# Patient Record
(demographics unavailable — no encounter records)

---

## 2025-06-23 NOTE — HISTORY OF PRESENT ILLNESS
[de-identified] : The upper endoscopy performed at the [facility] [place] GI endoscopy suite on September 7, 2023 revealed mild diffuse gastritis. There was no evidence of esophageal or gastric varices noted. The gastric pathology performed on September 7, 2023 revealed esophageal mucosa with squamous mucosa with mild reflux changes with a tiny detached fragment of columnar mucosa that was negative for goblet cell/intestinal metaplasia or dysplasia with a PASF stain that is negative for fungal microorganisms (esophagus), gastric antral mucosa with mild chronic and focally active antral gastritis with reactive changes that was negative for Helicobacter pylori (antrum), gastric body mucosa with gastric oxyntic mucosa with changes of reactive gastropathy that was negative for Helicobacter pylori (body of the stomach) and duodenal mucosa with preserved villous architecture with no significant histopathologic changes that was negative for increased intraepithelial lymphocytes (duodenum).   The upper endoscopy performed at [facility] on August 26, 2021 revealed mild diffuse bile gastritis and scattered nonbleeding gastric ulcers in the body and antrum of the stomach. The gastric pathology performed on August 26, 2021 revealed fragments of unremarkable squamous esophageal epithelium with a PASF stain that was negative for fungal microorganisms (esophagus), gastric antral and body mucosa with chronic inactive gastritis that was negative for Helicobacter pylori (gastric antrum and body of the stomach) and duodenal mucosa with preserved villous architecture with no parasites or increased intraepithelial lymphocytes (duodenum).  [FreeTextEntry1] : The MR elastography and MRI of the abdomen with and without IV contrast performed on June 9, 2022 revealed no suspicious hepatic lesion, a normal hepatic fat fraction, no hepatic iron deposition and hepatic stiffness of 3.5-4 kPa of stage II-III fibrosis.  Also noted was a 1.2 cm left hepatic lobe simple cyst with no suspicious hepatic lesions.  \par   [de-identified] : The liver fibroscan performed on December 27, 2021 revealed median liver stiffness of 14.4 kPa with IQR/Median % of 3 % that correlates to a fibrosis stage of F4, CAP score of 227 steatosis grade of S0. \par  The abdominal ultrasound performed on March 19, 2021 revealed heterogeneity and subtle nodular contour of the liver again suggestive of cirrhosis but otherwise unremarkable abdominal sonogram.\par

## 2025-06-23 NOTE — HISTORY OF PRESENT ILLNESS
[de-identified] : The [place] the [facility] [place] [place] on September 7, 2023 revealed mild diffuse gastritis. There was no evidence of esophageal or gastric varices noted. The gastric pathology performed on September 7, 2023 revealed esophageal mucosa with squamous mucosa with mild reflux changes with a tiny detached fragment of columnar mucosa that was negative for goblet cell/intestinal metaplasia or dysplasia with a PASF stain that is negative for fungal microorganisms (esophagus), gastric antral mucosa with mild chronic and focally active antral gastritis with reactive changes that was negative for Helicobacter pylori (antrum), gastric body mucosa with gastric oxyntic mucosa with changes of reactive gastropathy that was negative for Helicobacter pylori (body of the stomach) and duodenal mucosa with preserved villous architecture with no significant histopathologic changes that was negative for increased intraepithelial lymphocytes (duodenum).   The upper endoscopy performed at [facility] on August 26, 2021 revealed mild diffuse bile gastritis and scattered nonbleeding gastric ulcers in the body and antrum of the stomach. The gastric pathology performed on August 26, 2021 revealed fragments of unremarkable squamous esophageal epithelium with a PASF stain that was negative for fungal microorganisms (esophagus), gastric antral and body mucosa with chronic inactive gastritis that was negative for Helicobacter pylori (gastric antrum and body of the stomach) and duodenal mucosa with preserved villous architecture with no parasites or increased intraepithelial lymphocytes (duodenum).  [FreeTextEntry1] : The MR elastography and MRI of the abdomen with and without IV contrast performed on June 9, 2022 revealed no suspicious hepatic lesion, a normal hepatic fat fraction, no hepatic iron deposition and hepatic stiffness of 3.5-4 kPa of stage II-III fibrosis.  Also noted was a 1.2 cm left hepatic lobe simple cyst with no suspicious hepatic lesions.  \par   [de-identified] : The liver fibroscan performed on December 27, 2021 revealed median liver stiffness of 14.4 kPa with IQR/Median % of 3 % that correlates to a fibrosis stage of F4, CAP score of 227 steatosis grade of S0. \par  The abdominal ultrasound performed on March 19, 2021 revealed heterogeneity and subtle nodular contour of the liver again suggestive of cirrhosis but otherwise unremarkable abdominal sonogram.\par

## 2025-06-23 NOTE — ASSESSMENT
[FreeTextEntry1] : Dyspepsia: The patient complains of dyspeptic symptoms.  The patient was advised to continue to abide by an anti-gas (low FOD-MAP) diet.  The patient was previously given a pamphlet for anti-gas (low FOD-MAP).  The patient and I reviewed the anti-gas (low FOD-MAP)diet at length again. The patient is to continue on a trial of Simethicone one tablet 4 times a day p.r.n. abdominal pain and gas. Reflux Esophagitis:  The patient had reflux esophagitis noted on prior upper endoscopy.  The patient was advised to avoid late-night meals and dietary indiscretions.  The patient was advised to avoid fried and fatty foods.  The patient was advised to abide by an anti-GERD diet. The patient was given a pamphlet for anti-GERD.  The patient and I reviewed the anti-GERD diet at length.  I recommend a trial of pantoprazole 40 mg once a day x 3 months for the reflux esophagitis. Gastritis: The patient has a history of gastritis noted on prior upper endoscopy. The patient is to avoid nonsteroidal anti-inflammatory drugs and aspirin. I recommend a trial of pantoprazole 40 mg once a day for 3 months for the symptoms.  Internal Hemorrhoids: The patient is to consider starting a trial of Anusol H. C. suppositories one per rectum nightly and Anusol HC2 .5% cream apply to affected area twice a day p.r.n. hemorrhoidal bleeding or pain. I also recommend a trial of Calmoseptine cream apply to affected area twice a day for hemorrhoidal discomfort.  I recommend Tucks pads for the hemorrhoids.  I recommend Sitz baths twice a day for the hemorrhoids.  I recommend avoid wearing tight underwear and use boxers.  I recommend avoid touching the perineal area.  The patient agreed to followup.  Anemia: The patient was found to have anemia on prior blood work.  The patient denies any bright red blood per rectum, melena or hematemesis.   I recommend a colonoscopy to assess the anemia.  The patient was told of the risks and benefits of the procedure.  The patient was told of the risks of perforation, emergency surgery, bleeding, infections and missed lesions.  The patient agreed and will schedule for the procedure. The patient is to be n.p.o. after midnight and bowel prep was given.  The patient is to return for the procedure.   History of Peptic Ulcer Disease: The patient has a prior history of peptic ulcer disease. The patient is to avoid nonsteroidal anti-inflammatory drugs and aspirin. If the symptoms recur, the patient may require an upper endoscopy to assess for peptic ulcer disease versus esophagitis. The patient was told of the risks and benefits of the procedure. The patient was told of the risks of perforation, emergency surgery, bleeding, infections and missed lesions. The patient agreed and will follow-up to reassess the symptoms. History of Chronic Hepatitis C, s/p Treatment: The patient completed treatment for chronic hepatitis C with unknown medication with Dr. Michel retired. The patient was found to have hepatitis C antibody positive. The Hepatitis C PCR RNA qualitative was not detected. The patient was advised to followup in the office in 6 months for repeat blood work and imaging studies to reassess the liver. The patient was advised to avoid alcohol and hepatotoxic agents. The patient agrees and will follow-up. Prior Endoscopic Procedures: The patient had a prior upper endoscopy and colonoscopy performed by another gastroenterologist, Dr. Michel and Dr. Madonna Diaz. I will try to obtain the prior upper endoscopy and colonoscopy reports. The patient is to sign another record release to obtain those records. I reviewed the recent abdominal ultrasound results. Liver Cirrhosis: The abdominal ultrasound performed on March 19, 2021 revealed heterogeneity and subtle nodular contour of the liver again suggestive of cirrhosis but otherwise unremarkable abdominal sonogram. I recommend follow up with Dr. Reji Chino for liver cirrhosis. The liver fibroscan performed on December 27, 2021 revealed cirrhosis F4. The prior upper endoscopy did not reveal esophageal varices. The patient agrees and will followup. Colon Cancer screening: I recommend colonoscopy for colon cancer screening over the age of 45 to assess for colonic polyps. The patient was told of the risks and benefits of the procedure.  The patient was told of the risks of perforation, emergency surgery, bleeding, infections and missed lesions. The patient is to be on a clear liquid diet the entire day prior to the procedure. The patient is to complete the entire prescribed bowel prep the day prior to the procedure as directed. The patient is told not to drive, drink alcohol, use recreational drugs, exercise, or work the day of the procedure.  The patient was told of the need for an escort to accompany the patient home after the procedure. The patient is aware that the procedure may be cancelled if they fail to follow the directions.  The patient agreed and will schedule for the procedure. The patient can take the antihypertensive medication with a sip of water one hour prior to the procedure. The patient is to be n.p.o. after midnight and bowel prep was given.  The patient is to return for the procedure. Follow-up: The patient is to follow-up in the office in 4 weeks to reassess the symptoms. The patient was told to call the office if any further problems.